# Patient Record
Sex: FEMALE | Race: ASIAN | NOT HISPANIC OR LATINO | Employment: UNEMPLOYED | URBAN - METROPOLITAN AREA
[De-identification: names, ages, dates, MRNs, and addresses within clinical notes are randomized per-mention and may not be internally consistent; named-entity substitution may affect disease eponyms.]

---

## 2020-11-17 ENCOUNTER — OFFICE VISIT - HEALTHEAST (OUTPATIENT)
Dept: FAMILY MEDICINE | Facility: CLINIC | Age: 40
End: 2020-11-17

## 2020-11-17 DIAGNOSIS — Z13.220 ENCOUNTER FOR SCREENING FOR LIPOID DISORDERS: ICD-10-CM

## 2020-11-17 DIAGNOSIS — Z23 NEED FOR VACCINATION: ICD-10-CM

## 2020-11-17 DIAGNOSIS — Z13.1 ENCOUNTER FOR SCREENING FOR DIABETES MELLITUS: ICD-10-CM

## 2020-11-17 DIAGNOSIS — F32.A DEPRESSION, UNSPECIFIED DEPRESSION TYPE: ICD-10-CM

## 2020-11-17 DIAGNOSIS — H61.23 BILATERAL IMPACTED CERUMEN: ICD-10-CM

## 2020-11-17 DIAGNOSIS — Z00.01 ENCOUNTER FOR ROUTINE ADULT HEALTH EXAMINATION WITH ABNORMAL FINDINGS: ICD-10-CM

## 2020-11-17 LAB — HIV 1+2 AB+HIV1 P24 AG SERPL QL IA: NEGATIVE

## 2020-11-17 RX ORDER — RISPERIDONE 1 MG/1
1 TABLET ORAL 2 TIMES DAILY
Status: SHIPPED | COMMUNITY
Start: 2020-11-10

## 2020-11-17 ASSESSMENT — ANXIETY QUESTIONNAIRES
1. FEELING NERVOUS, ANXIOUS, OR ON EDGE: NOT AT ALL
6. BECOMING EASILY ANNOYED OR IRRITABLE: SEVERAL DAYS
5. BEING SO RESTLESS THAT IT IS HARD TO SIT STILL: NOT AT ALL
2. NOT BEING ABLE TO STOP OR CONTROL WORRYING: NOT AT ALL
IF YOU CHECKED OFF ANY PROBLEMS ON THIS QUESTIONNAIRE, HOW DIFFICULT HAVE THESE PROBLEMS MADE IT FOR YOU TO DO YOUR WORK, TAKE CARE OF THINGS AT HOME, OR GET ALONG WITH OTHER PEOPLE: NOT DIFFICULT AT ALL
3. WORRYING TOO MUCH ABOUT DIFFERENT THINGS: NOT AT ALL
4. TROUBLE RELAXING: NOT AT ALL
7. FEELING AFRAID AS IF SOMETHING AWFUL MIGHT HAPPEN: NOT AT ALL
GAD7 TOTAL SCORE: 1

## 2020-11-17 ASSESSMENT — PATIENT HEALTH QUESTIONNAIRE - PHQ9: SUM OF ALL RESPONSES TO PHQ QUESTIONS 1-9: 4

## 2020-11-17 ASSESSMENT — MIFFLIN-ST. JEOR: SCORE: 1051.55

## 2020-11-18 LAB — HCV AB SERPL QL IA: NEGATIVE

## 2020-11-19 ENCOUNTER — COMMUNICATION - HEALTHEAST (OUTPATIENT)
Dept: INTERNAL MEDICINE | Facility: CLINIC | Age: 40
End: 2020-11-19

## 2021-05-27 ASSESSMENT — PATIENT HEALTH QUESTIONNAIRE - PHQ9: SUM OF ALL RESPONSES TO PHQ QUESTIONS 1-9: 4

## 2021-05-28 ASSESSMENT — ANXIETY QUESTIONNAIRES: GAD7 TOTAL SCORE: 1

## 2021-06-04 VITALS
OXYGEN SATURATION: 100 % | BODY MASS INDEX: 23.18 KG/M2 | HEART RATE: 64 BPM | WEIGHT: 110.4 LBS | DIASTOLIC BLOOD PRESSURE: 74 MMHG | HEIGHT: 58 IN | SYSTOLIC BLOOD PRESSURE: 118 MMHG | TEMPERATURE: 96.4 F | RESPIRATION RATE: 18 BRPM

## 2021-06-21 NOTE — LETTER
Letter by Lalita Kelly MD at      Author: Lalita Kelly MD Service: -- Author Type: --    Filed:  Encounter Date: 11/19/2020 Status: (Other)         Louise Bagley  [address] [place] [address]             November 19, 2020         Dear Ms. Bagley,    Below are the results from your recent visit:    Resulted Orders   HIV Antigen/Antibody Screening Cascade   Result Value Ref Range    HIV Antigen / Antibody Negative Negative    Narrative    Method is Abbott HIV Ag/Ab for the detection of HIV p24 antigen, HIV-1 antibodies and HIV-2 antibodies.   Hepatitis C Antibody (Anti-HCV) (pts born 9[birth year])   Result Value Ref Range    Hepatitis C Ab Negative Negative       Negative HIV/Hepatitis C test results (That's good).    Please call with questions or contact us using [employer]    Sincerely,        Electronically signed by Lalita Kelly MD

## 2021-06-30 NOTE — PROGRESS NOTES
Progress Notes by Lalita Kelly MD at 11/17/2020  2:00 PM     Author: Lalita Kelly MD Service: -- Author Type: Physician    Filed: 11/23/2020 12:40 PM Encounter Date: 11/17/2020 Status: Addendum    : Lalita Kelly MD (Physician)    Related Notes: Original Note by Lalita Kelly MD (Physician) filed at 11/17/2020  2:47 PM       FEMALE PREVENTATIVE EXAM    Assessment and Plan:     Patient has been advised of split billing requirements and indicates understanding: Yes    1. Encounter for routine adult health examination with abnormal findings  Discussed one-time HIV and hepatitis C screening.  Ordered today.  Patient declined breast and Pap smear today.  - HIV Antigen/Antibody Screening Cascade  - Hepatitis C Antibody (Anti-HCV) (pts born 7[birth year]2194)    2. Bilateral impacted cerumen  Discussed with patient findings, which is likely the cause of the symptoms she was experiencing.  Patient does note improvement after cerumen removal with irrigation.    Depression:  Followed by psychiatry, SEBASTI[place]N completed to get records from psychiatry.  They are managing her risperidone.    3. Need for vaccination  I have discussed the risks and benefits of all of the vaccine components with the patient.  All questions have been answered.  - Influenza, Seasonal Quad, PF =/> 6months  - Tdap vaccine,  6yo or older,  IM    4. Encounter for screening for diabetes mellitus  5. Encounter for screening for lipoid disorders  She is not fasting today return in the next 1 to 2 weeks for fasting labs.  - Glucose- FASTING FUTURE; Future    - Lipid Cascade- FASTING FUTURE; Future     Next follow up:  Return in about 1 year (around 11/17/2021) for Annual physical.    Immunization Review  Adult Imm Review: Due today, orders placed      I discussed the following with the patient:   Adult Healthy Living: Importance of regular exercise  Healthy nutrition    I have had an Advance Directives discussion with the patient.    Subjective:   Chief  "Complaint: Louise Bagley is an 40 y.o. female, new to [facility], here for a preventative health visit.  She is here today with brother, who is interpreting for her.     Patient has been advised of split billing requirements and indicates understanding: Yes     HPI:  Brother interpreting for patient:    Patient and brother states that over the last 1.5 months patient has had decreased hearing.  No ear pain, but feels like both ears are \"blocked\".   No fevers or rhinorrhea.  Haven't tried any OTC medications.  No sore throat or cough.      Patient sees Dr. Cabrales, psychiatrist, who manages patient's risperidone.  Doesn't recall clinic name or doctor's first name. Per patient's brother, patient has \"long history of depression.\" No active suicidal or homicidal ideation. Contracts for safety.    Healthy Habits  Are you taking a daily aspirin? No  Do you typically exercising at least 40 min, 3-4 times per week?  NO  Do you usually eat at least 4 servings of fruit and vegetables a day, include whole grains and fiber and avoid regularly eating high fat foods? Yes  Have you had an eye exam in the past two years? Yes  Do you see a dentist twice per year? NO  Do you have any concerns regarding sleep? No    Safety Screen  If you own firearms, are they secured in a locked gun cabinet or with trigger locks? Yes  Do you feel you are safe where you are living?: Yes (11/17/2020  1:53 PM)  Do you feel you are safe in your relationship(s)?: Yes (11/17/2020  1:53 PM)      Review of Systems:  Please see above.  The rest of the review of systems are negative for all systems.     Pap History:   unsure when patient last had pap smear.  Patient declines pap smear today  Cancer Screening       Status Date      PAP SMEAR Overdue 10/3/2000           Patient Care Team:  Provider, No Primary Care as PCP - General        History     Reviewed By Date/Time Sections Reviewed    Lalita Kelly MD 11/17/2020  2:08 PM Social Documentation    " "Lalita Kelly MD 11/17/2020  2:07 PM Medical, Surgical, Family            Objective:   Vital Signs:   Visit Vitals  /74 (Patient Site: Right Arm, Patient Position: Sitting, Cuff Size: Adult Regular)   Pulse 64   Temp (!) 96.4  F (35.8  C) (Tympanic)   Resp 18   Ht 4' 9.75\" (1.467 m)   Wt 110 lb 6.4 oz (50.1 kg)   SpO2 100%   BMI 23.27 kg/m           PHYSICAL EXAM  General Appearance: Alert, cooperative, no distress, appears stated age  Head: Normocephalic, without obvious abnormality, atraumatic  Eyes: PERRL, conjunctiva/corneas clear, EOM's intact  Ears: Normal TM's and external ear canals, both ears AFTER CA removed significant amount of cerumen in both ears bilaterally with ear lavage.  Nose: Nares normal, septum midline,mucosa normal, no drainage  Throat: Lips, mucosa, and tongue normal; teeth and gums normal  Neck: Supple, symmetrical, trachea midline, no adenopathy;  thyroid: not enlarged, symmetric, no tenderness/mass/nodules;   Back: Symmetric, no curvature, ROM normal, no CVA tenderness  Lungs: Clear to auscultation bilaterally, respirations unlabored  Breasts: declined, per patient request  Heart: Regular rate and rhythm, no murmur.   Abdomen: Soft, non-tender, bowel sounds active all four quadrants,  no masses, no organomegaly  Pelvic:declined, per patient request  Extremities: Extremities normal, atraumatic, no cyanosis or edema  Skin: Skin color, texture, turgor normal, no rashes or lesions  Lymph nodes: Cervical, supraclavicular, and axillary nodes normal  Neurologic: Alert.   Psych: Normal affect.  Does not appear anxious or depressed.        The ASCVD Risk score (Royer DC Jr., et al., 2013) failed to calculate for the following reasons:    Cannot find a previous HDL lab    Cannot find a previous total cholesterol lab         Medication List          Accurate as of November 17, 2020  2:45 PM. If you have any questions, ask your nurse or doctor.            CONTINUE taking these medications  "   risperiDONE 1 MG tablet  Also known as: RisperDAL  INSTRUCTIONS: Take 1 mg by mouth 2 (two) times a day. Takes 2 in the evening, 1 in the morning               Additional Screenings Completed Today:

## 2022-02-01 ENCOUNTER — TELEPHONE (OUTPATIENT)
Dept: INTERNAL MEDICINE | Facility: CLINIC | Age: 42
End: 2022-02-01
Payer: COMMERCIAL

## 2022-02-01 NOTE — LETTER
Louise Bagley  [address] [place] [address]          We have been unable to reach you by phone and have left messages.  You have been incorrectly scheduled with Dr. Méndez at the [facility].  Dr. Méndez is not taking patients into his practice at this time.     Please call [phone] and we can assist you with scheduling with another provider.  The current providers at [facility] taking new patients are Dr. Clemens, Dr. Munoz, and Dr. Xavier                        [address] [address] [phone]

## 2022-02-01 NOTE — TELEPHONE ENCOUNTER
Used  line to inform of Dr. Méndez's message and assist with rescheduling appointment.    Left message for pt to call back if pt calls back please inform of Dr. Méndez's message and assist with scheduling pt with a provider taking new pts.  At [facility] that is Dr. Munoz, Dr. Clemens, and Dr. Xavier.

## 2022-02-01 NOTE — TELEPHONE ENCOUNTER
Please call patient -    ______________________________________________________________________     Home phone:  [phone] (home)     Cell phone:   Telephone Information:   Mobile [phone]       Other contacts:  Name Home Phone Work Phone Mobile Phone Relationship Lgl Lonnie GAN,JASEN   [phone] Other      ______________________________________________________________________     Need  for call.    She is scheduled for a physical but she was incorrectly scheduled.    She should be scheduled with a provider taking new patients.    Aj Méndez MD  [facility]  2/1/2022, 2:40 PM

## 2022-02-04 NOTE — TELEPHONE ENCOUNTER
Called PT, left a Voicemail to call and get scheduled with a provider that is taking new patients.

## 2022-06-10 NOTE — PROGRESS NOTES
SUBJECTIVE:   CC: Louise Bagley is an 41 year old woman who presents for preventive health visit.     Patient has been advised of split billing requirements and indicates understanding: Yes  Healthy Habits:     Getting at least 3 servings of Calcium per day:  NO    Bi-annual eye exam:  NO    Dental care twice a year:  NO    Sleep apnea or symptoms of sleep apnea:  None    Diet:  Regular (no restrictions)    Frequency of exercise:  None    Taking medications regularly:  Yes    Medication side effects:  None    PHQ-2 Total Score: 2    Additional concerns today:  No      Today's PHQ-2 Score:   PHQ-2 ( 1999 Pfizer) 6/13/2022   Q1: Little interest or pleasure in doing things 0   Q2: Feeling down, depressed or hopeless 2   PHQ-2 Score 2   Q1: Little interest or pleasure in doing things Not at all   Q2: Feeling down, depressed or hopeless More than half the days   PHQ-2 Score 2       Abuse: Current or Past (Physical, Sexual or Emotional) - No  Do you feel safe in your environment? Yes        Social History     Tobacco Use     Smoking status: Never Smoker     Smokeless tobacco: Never Used   Substance Use Topics     Alcohol use: Never     If you drink alcohol do you typically have >3 drinks per day or >7 drinks per week? No    Alcohol Use 6/13/2022   Prescreen: >3 drinks/day or >7 drinks/week? No   No flowsheet data found.    Reviewed orders with patient.  Reviewed health maintenance and updated orders accordingly - Yes  Lab work is in process  Labs reviewed in EPIC  BP Readings from Last 3 Encounters:   06/13/22 118/80   11/17/20 118/74    Wt Readings from Last 3 Encounters:   06/13/22 42.9 kg (94 lb 9.6 oz)   11/17/20 50.1 kg (110 lb 6.4 oz)                  There is no problem list on file for this patient.    Past Surgical History:   Procedure Laterality Date     NO PAST SURGERIES         Social History     Tobacco Use     Smoking status: Never Smoker     Smokeless tobacco: Never Used   Substance Use Topics      Alcohol use: Never     Family History   Problem Relation Age of Onset     Gallbladder Disease Father          of gallbladder cancer     Autism Spectrum Disorder Nephew      Diabetes Type 1 Nephew          Current Outpatient Medications   Medication Sig Dispense Refill     risperiDONE (RISPERDAL) 1 MG tablet [RISPERIDONE (RISPERDAL) 1 MG TABLET] Take 1 mg by mouth 2 (two) times a day. Takes 2 in the evening, 1 in the morning       No Known Allergies    Breast Cancer Screening:  Any new diagnosis of family breast, ovarian, or bowel cancer? No    FHS-7: No flowsheet data found.      Pertinent mammograms are reviewed under the imaging tab.    History of abnormal Pap smear: NO - age 30-65 PAP every 5 years with negative HPV co-testing recommended     Reviewed and updated as needed this visit by clinical staff   Tobacco  Allergies    Med Hx  Surg Hx  Fam Hx  Soc Hx          Reviewed and updated as needed this visit by Provider                   History reviewed. No pertinent past medical history.   Past Surgical History:   Procedure Laterality Date     NO PAST SURGERIES         Review of Systems   Respiratory: Positive for cough.    Neurological: Positive for weakness.     CONSTITUTIONAL: NEGATIVE for fever, chills, change in weight  INTEGUMENTARU/SKIN: NEGATIVE for worrisome rashes, moles or lesions  EYES: NEGATIVE for vision changes or irritation  ENT: NEGATIVE for ear, mouth and throat problems  RESP: NEGATIVE for significant cough or SOB  BREAST: NEGATIVE for masses, tenderness or discharge  CV: NEGATIVE for chest pain, palpitations or peripheral edema  GI: NEGATIVE for nausea, abdominal pain, heartburn, or change in bowel habits  : NEGATIVE for unusual urinary or vaginal symptoms. Periods are regular.  MUSCULOSKELETAL: NEGATIVE for significant arthralgias or myalgia  NEURO: NEGATIVE for weakness, dizziness or paresthesias  PSYCHIATRIC: NEGATIVE for changes in mood or affect     OBJECTIVE:   /80 (BP  "Location: Right arm, Patient Position: Sitting, Cuff Size: Adult Small)   Pulse 74   Temp 98.1  F (36.7  C) (Oral)   Resp 16   Ht 1.454 m (4' 9.25\")   Wt 42.9 kg (94 lb 9.6 oz)   LMP 05/27/2022 (Approximate)   SpO2 98%   BMI 20.29 kg/m    Physical Exam  GENERAL: healthy, alert and no distress  RESP: lungs clear to auscultation - no rales, rhonchi or wheezes  CV: regular rate and rhythm, normal S1 S2, no S3 or S4, no murmur, click or rub, no peripheral edema and peripheral pulses strong  PSYCH: mentation appears normal, affect anxious    ASSESSMENT/PLAN:       ICD-10-CM    1. Routine general medical examination at a health care facility  Z00.00 TSH with free T4 reflex     Vitamin D Deficiency     CBC with platelets     Lipid Profile (Chol, Trig, HDL, LDL calc)     Hemoglobin A1c     Comprehensive metabolic panel (BMP + Alb, Alk Phos, ALT, AST, Total. Bili, TP)   2. Cervical cancer screening  Z12.4 Pap Screen with HPV - recommended age 30 - 65 years   3. Chronic midline low back pain without sciatica  M54.50 XR Lumbar Spine 2/3 Views    G89.29    4. Weight loss  R63.4 Comprehensive metabolic panel (BMP + Alb, Alk Phos, ALT, AST, Total. Bili, TP)   5. Schizophrenia, unspecified type (H)  F20.9    6. Intellectual delay  F81.9        Patient is a 41-year-old female with PMH of schizophrenia, intellectual delay and tardive dyskinesia who presents with her brother for annual physical.     is on the line for this visit    They moved from [place] a few years ago.  Does not see a doctor often.  Today, patient is quite fearful of a physical exam  Has some level of intellectual delay and does not seem completely understanding of the visit today  Does not answer questions fully  Only did heart/lung exam.  Pap smear, breast exam deferred  No known family history of cancers  No high risk behaviors identified or substance abuse identified   Menses-neither the brother or the patient was able to provide me with " "this information.  Unclear if it is regular or heavy  Needs updated eye and dental exam   Main concern for the brother today is that she has been losing weight over the last few months.  Appetite is decreased.  She appears to be weak and cannot move around a lot.  No changes in her medications.  No new stressors.  We will get some baseline blood work to start with her fatigue and weight loss.  We will see her back closely in 6 weeks for ongoing work-up.    Schizophrenia  Does have a psychiatrist that she follows with.  Is currently on risperidone.  Stable per brother.    Low back pain:  Patient repeatedly pointed to her low back.  Said it hurt but was not able to let me do an exam on her.  We will start with an x-ray of the lumbar spine and go from there.    Patient has been advised of split billing requirements and indicates understanding: Yes    COUNSELING:  Reviewed preventive health counseling, as reflected in patient instructions    Estimated body mass index is 20.29 kg/m  as calculated from the following:    Height as of this encounter: 1.454 m (4' 9.25\").    Weight as of this encounter: 42.9 kg (94 lb 9.6 oz).    She reports that she has never smoked. She has never used smokeless tobacco.    Bettye Munoz DO  [facility]  "

## 2022-06-13 ENCOUNTER — OFFICE VISIT (OUTPATIENT)
Dept: FAMILY MEDICINE | Facility: CLINIC | Age: 42
End: 2022-06-13
Payer: COMMERCIAL

## 2022-06-13 ENCOUNTER — HOSPITAL ENCOUNTER (OUTPATIENT)
Dept: GENERAL RADIOLOGY | Facility: HOSPITAL | Age: 42
Discharge: HOME | End: 2022-06-13
Attending: STUDENT IN AN ORGANIZED HEALTH CARE EDUCATION/TRAINING PROGRAM | Admitting: STUDENT IN AN ORGANIZED HEALTH CARE EDUCATION/TRAINING PROGRAM
Payer: COMMERCIAL

## 2022-06-13 VITALS
SYSTOLIC BLOOD PRESSURE: 118 MMHG | OXYGEN SATURATION: 98 % | DIASTOLIC BLOOD PRESSURE: 80 MMHG | BODY MASS INDEX: 20.41 KG/M2 | HEART RATE: 74 BPM | WEIGHT: 94.6 LBS | RESPIRATION RATE: 16 BRPM | TEMPERATURE: 98.1 F | HEIGHT: 57 IN

## 2022-06-13 DIAGNOSIS — Z00.00 ROUTINE GENERAL MEDICAL EXAMINATION AT A HEALTH CARE FACILITY: Primary | ICD-10-CM

## 2022-06-13 DIAGNOSIS — F20.9 SCHIZOPHRENIA, UNSPECIFIED TYPE (H): ICD-10-CM

## 2022-06-13 DIAGNOSIS — Z12.4 CERVICAL CANCER SCREENING: ICD-10-CM

## 2022-06-13 DIAGNOSIS — G89.29 CHRONIC MIDLINE LOW BACK PAIN WITHOUT SCIATICA: ICD-10-CM

## 2022-06-13 DIAGNOSIS — F81.9 INTELLECTUAL DELAY: ICD-10-CM

## 2022-06-13 DIAGNOSIS — M54.50 CHRONIC MIDLINE LOW BACK PAIN WITHOUT SCIATICA: ICD-10-CM

## 2022-06-13 DIAGNOSIS — R63.4 WEIGHT LOSS: ICD-10-CM

## 2022-06-13 LAB
ALBUMIN SERPL-MCNC: 3.8 G/DL (ref 3.5–5)
ALP SERPL-CCNC: 90 U/L (ref 45–120)
ALT SERPL W P-5'-P-CCNC: 24 U/L (ref 0–45)
ANION GAP SERPL CALCULATED.3IONS-SCNC: 11 MMOL/L (ref 5–18)
AST SERPL W P-5'-P-CCNC: 13 U/L (ref 0–40)
BILIRUB SERPL-MCNC: 1.3 MG/DL (ref 0–1)
BUN SERPL-MCNC: 8 MG/DL (ref 8–22)
CALCIUM SERPL-MCNC: 9.1 MG/DL (ref 8.5–10.5)
CHLORIDE BLD-SCNC: 104 MMOL/L (ref 98–107)
CHOLEST SERPL-MCNC: 102 MG/DL
CO2 SERPL-SCNC: 27 MMOL/L (ref 22–31)
CREAT SERPL-MCNC: 0.7 MG/DL (ref 0.6–1.1)
DEPRECATED CALCIDIOL+CALCIFEROL SERPL-MC: 6 UG/L (ref 20–75)
ERYTHROCYTE [DISTWIDTH] IN BLOOD BY AUTOMATED COUNT: 12.1 % (ref 10–15)
FASTING STATUS PATIENT QL REPORTED: NO
GFR SERPL CREATININE-BSD FRML MDRD: >90 ML/MIN/1.73M2
GLUCOSE BLD-MCNC: 98 MG/DL (ref 70–125)
HBA1C MFR BLD: 5.5 % (ref 0–5.6)
HCT VFR BLD AUTO: 38.2 % (ref 35–47)
HDLC SERPL-MCNC: 37 MG/DL
HGB BLD-MCNC: 13 G/DL (ref 11.7–15.7)
LDLC SERPL CALC-MCNC: 45 MG/DL
MCH RBC QN AUTO: 29.8 PG (ref 26.5–33)
MCHC RBC AUTO-ENTMCNC: 34 G/DL (ref 31.5–36.5)
MCV RBC AUTO: 88 FL (ref 78–100)
PLATELET # BLD AUTO: 241 10E3/UL (ref 150–450)
POTASSIUM BLD-SCNC: 4 MMOL/L (ref 3.5–5)
PROT SERPL-MCNC: 7 G/DL (ref 6–8)
RBC # BLD AUTO: 4.36 10E6/UL (ref 3.8–5.2)
SODIUM SERPL-SCNC: 142 MMOL/L (ref 136–145)
TRIGL SERPL-MCNC: 100 MG/DL
TSH SERPL DL<=0.005 MIU/L-ACNC: 3.31 UIU/ML (ref 0.3–5)
WBC # BLD AUTO: 6.4 10E3/UL (ref 4–11)

## 2022-06-13 PROCEDURE — 84443 ASSAY THYROID STIM HORMONE: CPT | Performed by: STUDENT IN AN ORGANIZED HEALTH CARE EDUCATION/TRAINING PROGRAM

## 2022-06-13 PROCEDURE — 80053 COMPREHEN METABOLIC PANEL: CPT | Performed by: STUDENT IN AN ORGANIZED HEALTH CARE EDUCATION/TRAINING PROGRAM

## 2022-06-13 PROCEDURE — 85027 COMPLETE CBC AUTOMATED: CPT | Performed by: STUDENT IN AN ORGANIZED HEALTH CARE EDUCATION/TRAINING PROGRAM

## 2022-06-13 PROCEDURE — 99213 OFFICE O/P EST LOW 20 MIN: CPT | Mod: 25 | Performed by: STUDENT IN AN ORGANIZED HEALTH CARE EDUCATION/TRAINING PROGRAM

## 2022-06-13 PROCEDURE — 72100 X-RAY EXAM L-S SPINE 2/3 VWS: CPT | Mod: FY

## 2022-06-13 PROCEDURE — 82306 VITAMIN D 25 HYDROXY: CPT | Performed by: STUDENT IN AN ORGANIZED HEALTH CARE EDUCATION/TRAINING PROGRAM

## 2022-06-13 PROCEDURE — 99396 PREV VISIT EST AGE 40-64: CPT | Performed by: STUDENT IN AN ORGANIZED HEALTH CARE EDUCATION/TRAINING PROGRAM

## 2022-06-13 PROCEDURE — 80061 LIPID PANEL: CPT | Performed by: STUDENT IN AN ORGANIZED HEALTH CARE EDUCATION/TRAINING PROGRAM

## 2022-06-13 PROCEDURE — 36415 COLL VENOUS BLD VENIPUNCTURE: CPT | Performed by: STUDENT IN AN ORGANIZED HEALTH CARE EDUCATION/TRAINING PROGRAM

## 2022-06-13 PROCEDURE — 83036 HEMOGLOBIN GLYCOSYLATED A1C: CPT | Performed by: STUDENT IN AN ORGANIZED HEALTH CARE EDUCATION/TRAINING PROGRAM

## 2022-06-13 ASSESSMENT — ENCOUNTER SYMPTOMS
COUGH: 1
WEAKNESS: 1

## 2022-06-13 ASSESSMENT — PAIN SCALES - GENERAL: PAINLEVEL: NO PAIN (0)

## 2022-06-15 ENCOUNTER — TELEPHONE (OUTPATIENT)
Dept: FAMILY MEDICINE | Facility: CLINIC | Age: 42
End: 2022-06-15
Payer: COMMERCIAL

## 2022-06-15 DIAGNOSIS — G89.29 CHRONIC MIDLINE LOW BACK PAIN WITHOUT SCIATICA: Primary | ICD-10-CM

## 2022-06-15 DIAGNOSIS — E55.9 VITAMIN D DEFICIENCY: Primary | ICD-10-CM

## 2022-06-15 DIAGNOSIS — M54.50 CHRONIC MIDLINE LOW BACK PAIN WITHOUT SCIATICA: Primary | ICD-10-CM

## 2022-06-15 NOTE — TELEPHONE ENCOUNTER
"Called and spoke with \"Fanny.\"  Message below relayed to him.  Patient had x-ray completed on 6/13.  Patient asked if the Vitamin D supplements can be prescribed as an Rx.  Writer informed this would be sent to Dr. Munoz to review.   "

## 2022-06-15 NOTE — TELEPHONE ENCOUNTER
Detailed message left on voicemail with Dr. Munoz's message and recommendations below.  PT number also provided in voicemail message and to call clinic back if they have any other questions.

## 2022-06-15 NOTE — TELEPHONE ENCOUNTER
----- Message from Bettye Munoz DO sent at 6/15/2022  7:21 AM CDT -----  Call the brother to relay message:     Lumbar Xray doesn't show any arthritis or disc abnormalities. Her pain in the low back is most likely due to muscular pain.  I think she would benefit from dedication physical therapy. Referral has been placed.

## 2022-06-16 RX ORDER — ERGOCALCIFEROL 1.25 MG/1
50000 CAPSULE, LIQUID FILLED ORAL WEEKLY
Qty: 8 CAPSULE | Refills: 0 | Status: SHIPPED | OUTPATIENT
Start: 2022-06-16 | End: 2022-08-05

## 2022-07-18 NOTE — PROGRESS NOTES
FOLLOW UP WEIGHT LOSS***41/F  SR 6/13    PHYSICAL DUE    PAP DUE- NA  MAMMOGRAM- NA       SUBJECTIVE:   CC: Louise Bagley is an 41 year old woman who presents for preventive health visit.     {Split Bill scripting  The purpose of this visit is to discuss your medical history and prevent health problems before you are sick. You may be responsible for a co-pay, coinsurance, or deductible if your visit today includes services such as checking on a sore throat, having an x-ray or lab test, or treating and evaluating a new or existing condition :203462}  {Patient advised of split billing (Optional):614862}  HPI  {Add if <65 person on Medicare  - Required Questions (Optional):932687}  {Outside tests to abstract? :029574}    {additional problems to add (Optional):404035}    Today's PHQ-2 Score:   PHQ-2 ( 1999 Pfizer) 6/13/2022   Q1: Little interest or pleasure in doing things 0   Q2: Feeling down, depressed or hopeless 2   PHQ-2 Score 2   Q1: Little interest or pleasure in doing things Not at all   Q2: Feeling down, depressed or hopeless More than half the days   PHQ-2 Score 2       Abuse: Current or Past (Physical, Sexual or Emotional) - { :540944}  Do you feel safe in your environment? { :231135}        Social History     Tobacco Use     Smoking status: Never Smoker     Smokeless tobacco: Never Used   Substance Use Topics     Alcohol use: Never     {Rooming Staff- Complete this question if Prescreen response is not shown below for today's visit. If you drink alcohol do you typically have >3 drinks per day or >7 drinks per week? (Optional):933414}    Alcohol Use 6/13/2022   Prescreen: >3 drinks/day or >7 drinks/week? No   Prescreen: >3 drinks/day or >7 drinks/week? -   {add AUDIT responses (Optional) (A score of 7 for adult men is an indication of hazardous drinking; a score of 8 or more is an indication of an alcohol use disorder.  A score of 7 or more for adult women is an indication of hazardous drinking or an  "alchohol use disorder):706115}    Reviewed orders with patient.  Reviewed health maintenance and updated orders accordingly - { :049169::\"Yes\"}  {Chronicprobdata (optional):897450}    Breast Cancer Screening:    Breast CA Risk Assessment (FHS-7) 6/13/2022   Do you have a family history of breast, colon, or ovarian cancer? No / Unknown       {If any of the questions to the BCRA (FHS-7) are answered yes, consider ordering referral for genetic counseling (Optional) :614619::\"click delete button to remove this line now\"}  {AMB Mammogram Decision Support (Optional) :759699}  Pertinent mammograms are reviewed under the imaging tab.    History of abnormal Pap smear: { :716103}     Reviewed and updated as needed this visit by clinical staff                    Reviewed and updated as needed this visit by Provider                   {HISTORY OPTIONS (Optional):093566}    Review of Systems  {FEMALE ROS (Optional):286599}     OBJECTIVE:   LMP 05/27/2022 (Approximate)   Physical Exam  {Exam Choices (Optional):041032}    {Diagnostic Test Results (Optional):601567::\"Diagnostic Test Results:\",\"Labs reviewed in Epic\"}    ASSESSMENT/PLAN:   {Diag Picklist:812449}    {Patient advised of split billing (Optional):777979}    COUNSELING:  {FEMALE COUNSELING MESSAGES:688312::\"Reviewed preventive health counseling, as reflected in patient instructions\"}    Estimated body mass index is 20.29 kg/m  as calculated from the following:    Height as of 6/13/22: 1.454 m (4' 9.25\").    Weight as of 6/13/22: 42.9 kg (94 lb 9.6 oz).    {Weight Management Plan (ACO) Complete if BMI is abnormal-  Ages 18-64  BMI >24.9.  Age 65+ with BMI <23 or >30 (Optional):757707}    She reports that she has never smoked. She has never used smokeless tobacco.      Counseling Resources:  ATP IV Guidelines  Pooled Cohorts Equation Calculator  Breast Cancer Risk Calculator  BRCA-Related Cancer Risk Assessment: FHS-7 Tool  FRAX Risk Assessment  ICSI Preventive " Guidelines  Dietary Guidelines for Americans, 2010  USDA's MyPlate  ASA Prophylaxis  Lung CA Screening    Bettye Munoz DO  [facility]

## 2022-07-19 ENCOUNTER — OFFICE VISIT (OUTPATIENT)
Dept: FAMILY MEDICINE | Facility: CLINIC | Age: 42
End: 2022-07-19
Payer: COMMERCIAL

## 2022-07-19 VITALS
DIASTOLIC BLOOD PRESSURE: 76 MMHG | TEMPERATURE: 98.6 F | OXYGEN SATURATION: 98 % | BODY MASS INDEX: 20.4 KG/M2 | SYSTOLIC BLOOD PRESSURE: 110 MMHG | HEART RATE: 75 BPM | RESPIRATION RATE: 16 BRPM | WEIGHT: 95.1 LBS

## 2022-07-19 DIAGNOSIS — H91.93 DECREASED HEARING, BILATERAL: ICD-10-CM

## 2022-07-19 DIAGNOSIS — R63.4 WEIGHT LOSS: Primary | ICD-10-CM

## 2022-07-19 DIAGNOSIS — E55.9 VITAMIN D DEFICIENCY: ICD-10-CM

## 2022-07-19 PROCEDURE — 99214 OFFICE O/P EST MOD 30 MIN: CPT | Performed by: STUDENT IN AN ORGANIZED HEALTH CARE EDUCATION/TRAINING PROGRAM

## 2022-07-19 RX ORDER — ERGOCALCIFEROL 1.25 MG/1
50000 CAPSULE, LIQUID FILLED ORAL WEEKLY
Qty: 8 CAPSULE | Refills: 0 | Status: CANCELLED | OUTPATIENT
Start: 2022-07-19

## 2022-07-19 RX ORDER — CHOLECALCIFEROL (VITAMIN D3) 50 MCG
1 TABLET ORAL DAILY
Qty: 90 TABLET | Refills: 3 | Status: SHIPPED | OUTPATIENT
Start: 2022-08-12

## 2022-07-19 ASSESSMENT — PAIN SCALES - GENERAL: PAINLEVEL: NO PAIN (0)

## 2022-07-19 NOTE — PROGRESS NOTES
Assessment & Plan   Problem List Items Addressed This Visit        Other    Weight loss - Primary      Other Visit Diagnoses     Vitamin D deficiency        Relevant Medications    vitamin D3 (CHOLECALCIFEROL) 50 mcg (2000 units) tablet (Start on 8/12/2022)    Decreased hearing, bilateral        Relevant Medications    vitamin D3 (CHOLECALCIFEROL) 50 mcg (2000 units) tablet (Start on 8/12/2022)    Other Relevant Orders    Adult Audiology  Referral           Weight loss:  Patient has gained a pound since I last saw her  Does seem more energetic and interactive  Is tolerating the vitamin D well  Given stable weight and improved energy on the vitamin D, we will not do any further work-up  Plan:  Finish up weekly vitamin D 50,000 IU.  Once done with the weekly dosing, should switch to vitamin D 2000 IU daily.  This is available OTC.  Brother verbalized understanding.    Low back pain:  Has been a chronic ongoing issue  Lumbar x-ray was unremarkable and patient was sent to physical therapy for low back pain  The back pain has not progressed and she is not reporting any fecal/urinary incontinence or saddle anesthesias  They have an upcoming appointment with physical therapy    Decreased hearing:  Patient is reporting that she has decreased hearing bilaterally  Physical exam is negative for any evidence of infection, effusion, cerumen impaction or irritation.  Unclear why patient is having subjective bilateral decreased hearing acuity.  Will send to audiology for official hearing test as language barrier and intellectual delay make it hard to discern her symptoms.      35 minutes spent on the date of the encounter doing chart review, history and exam, documentation and further activities per the note    Return in about 3 months (around 10/19/2022) for weight .    Bettye Munoz DO  [facility]    John Gil is a 41 year old accompanied by her brother, presenting for the following  health issues:  Follow Up (On weight loss ) and Ear Fullness (She cant hear out of her right ear. And also the left ear x1 week, no pain )    Patient is a 41-year-old female with PMH of schizophrenia, intellectual delay and tardive dyskinesia who presents today for follow-up on weight loss    Was recently seen for an annual physical at which time mother was very concerned about fatigue and weight loss.  Blood work was significant for vitamin D deficiency.    Today, brother is translating in the room.  They declined an     Brother notes that she is more energetic ever since she started taking vitamin D consistently.  Has been eating a little bit more.  Has not necessarily gained a lot of weight but is not losing weight either.  They are trying to feed her more consistently.    Review of Systems   As per HPI       Objective    /76 (BP Location: Right arm, Patient Position: Sitting, Cuff Size: Adult Regular)   Pulse 75   Temp 98.6  F (37  C) (Oral)   Resp 16   Wt 43.1 kg (95 lb 1.6 oz)   LMP 06/26/2022   SpO2 98%   BMI 20.40 kg/m    Body mass index is 20.4 kg/m .  Physical Exam   GENERAL: thin, NAD   HENT: ear canals normal bilaterally without cerumen impaction, no evidence of AOM bilaterally, no serious effusion behind TMs.   NECK: no adenopathy, no asymmetry, masses, or scars and thyroid normal to palpation  RESP: lungs clear to auscultation - no rales, rhonchi or wheezes  CV: regular rate and rhythm, normal S1 S2, no S3 or S4, no murmur, click or rub, no peripheral edema and peripheral pulses strong  PSYCH: affect flat, anxious, delayed answering of questions.           .  ..

## 2022-08-19 ENCOUNTER — HOSPITAL ENCOUNTER (OUTPATIENT)
Dept: PHYSICAL THERAPY | Facility: REHABILITATION | Age: 42
Discharge: HOME | End: 2022-08-19
Attending: STUDENT IN AN ORGANIZED HEALTH CARE EDUCATION/TRAINING PROGRAM
Payer: COMMERCIAL

## 2022-08-19 DIAGNOSIS — M54.50 CHRONIC MIDLINE LOW BACK PAIN WITHOUT SCIATICA: ICD-10-CM

## 2022-08-19 DIAGNOSIS — M54.50 ACUTE BILATERAL LOW BACK PAIN WITHOUT SCIATICA: Primary | ICD-10-CM

## 2022-08-19 DIAGNOSIS — M62.81 MUSCLE WEAKNESS (GENERALIZED): ICD-10-CM

## 2022-08-19 DIAGNOSIS — G89.29 CHRONIC MIDLINE LOW BACK PAIN WITHOUT SCIATICA: ICD-10-CM

## 2022-08-19 PROCEDURE — 97110 THERAPEUTIC EXERCISES: CPT | Mod: GP | Performed by: PHYSICAL THERAPIST

## 2022-08-19 PROCEDURE — 97161 PT EVAL LOW COMPLEX 20 MIN: CPT | Mod: GP | Performed by: PHYSICAL THERAPIST

## 2022-08-19 NOTE — PROGRESS NOTES
[facility]    OUTPATIENT PHYSICAL THERAPY ORTHOPEDIC EVALUATION  PLAN OF TREATMENT FOR OUTPATIENT REHABILITATION  (COMPLETE FOR INITIAL CLAIMS ONLY)  Patient's Last Name, First Name, M.I.  YOB: 1980  Louise Bagley    Provider s Name:  [facility]   Medical Record No.  [id number]   Start of Care Date:  08/19/22   Onset Date:  05/19/22   Type:     _X__PT   ___OT   ___SLP Medical Diagnosis:  Chronic midline low back pain without sciatica     PT Diagnosis:  acute skyla low back pain without sciatica, muscle weakness   Visits from SOC:  1      _________________________________________________________________________________  Plan of Treatment/Functional Goals:  gait training, balance training, bed mobility training, joint mobilization, manual therapy, neuromuscular re-education, ROM, strengthening, stretching, transfer training     Cryotherapy, Electrical stimulation, Hot packs, TENS     Goals  Goal Identifier: (P) HEP  Goal Description: (P) Pt will be independent in HEP to address impairments and improve pain/function  Target Date: (P) 10/18/22    Goal Identifier: (P) Bending/lifting  Goal Description: (P) Pt will be able to bend and lift 10# with <4/10 pain and correct body mechanics for household chores  Target Date: (P) 11/16/22    Goal Identifier: (P) Walking  Goal Description: (P) Pt will be able to walk for 15-30 min with <3/10 pain for community mobility and exercise  Target Date: (P) 11/16/22                                                           Therapy Frequency:  (P) other (see comments) (1x to every few weeks due to clinic availability)  Predicted Duration of Therapy Intervention:  (P) 12 weeks for up to 8 sessions    Barb Cantrell, PT                 I CERTIFY THE NEED FOR THESE SERVICES FURNISHED UNDER        THIS PLAN OF TREATMENT AND WHILE UNDER MY  "CARE     (Physician co-signature of this document indicates review and certification of the therapy plan).                     Certification Date From:  08/19/22   Certification Date To:  11/16/22    Referring Provider:  Bettye Munoz DO    Initial Assessment        See Epic Evaluation Start of Care Date: 08/19/22 08/19/22 0900   General Information   Type of Visit Initial OP Ortho PT Evaluation   Start of Care Date 08/19/22   Referring Physician Bettye Munoz, DO   Patient/Family Goals Statement \"to safe from back pain\"   Certification Required? Yes   Medical Diagnosis Chronic midline low back pain without sciatica       Present Yes   Language Other  (French in person)   Body Part(s)   Body Part(s) Lumbar Spine/SI   Presentation and Etiology   Pertinent history of current problem (include personal factors and/or comorbidities that impact the POC) Pt reports skyla lumbar spine pain that will radiate into skyla buttocks. Her pain started about 3 months ago with insidious onset. Pt denies any numbness but intermittent tingling in both feet just in her toes. Pt reports difficulty with lifting, bending, transitions, and bed mobility. She walks on her treadmill for 15-30 min daily- does increase low back pain.   Impairments A. Pain;L. Tingling;E. Decreased flexibility;F. Decreased strength and endurance;D. Decreased ROM   Symptom Location skyla lumbar spine that can radiate to skyla buttocks   How/Where did it occur From insidious onset   Onset date of current episode/exacerbation 05/19/22   Chronicity New   Pain rating (0-10 point scale) Best (/10);Worst (/10)   Best (/10) 1   Worst (/10) 9   Pain quality C. Aching;B. Dull   Frequency of pain/symptoms A. Constant   Pain/symptoms are:   (worse by the end of her day)   Pain/symptoms exacerbated by B. Walking;I. Bending   Pain/symptoms eased by   (medications)   Progression of symptoms since onset: Worsened   Current / Previous " Interventions   Diagnostic Tests: X-ray   X-ray Results Results   X-ray results 5 lumbar vertebrae. Normal alignment. No displaced fracture. Vertebral body heights are maintained. No aggressive sclerotic or lytic osseous lesion. Mild degenerative disc disease of lumbar spine. No significant facet arthropathy. No acute   extraspinal abnormality.   Fall Risk Screen   Fall screen completed by PT   Have you fallen 2 or more times in the past year? No   Have you fallen and had an injury in the past year? No   Is patient a fall risk? No   Abuse Screen (yes response referral indicated)   Feels Unsafe at Home or Work/School no   Feels Threatened by Someone no   Does Anyone Try to Keep You From Having Contact with Others or Doing Things Outside Your Home? no   Physical Signs of Abuse Present no   System Outcome Measures   Outcome Measures   (Modified KIRSTIN: 40%)   Lumbar Spine/SI Objective Findings   Gait/Locomotion APTA 30 sec STS: 8.5 reps from standard chair with arms across chest   Flexion ROM to ankles with mild pain   Extension ROM moderate with pain   Right Side Bending ROM mild   Left Side Bending ROM mild   Lumbar ROM Comment Rotation: R mild, L mild   Hip Screen Hip PROM WNL skyla, negative Scour and FADIR skyla   Hip Flexion (L2) Strength 4+/5 on R, 4-/5 on L   Knee Extension (L3) Strength 5/5 on R, 3-/5 on L   Ankle Dorsiflexion (L4) Strength 5/5 on R, 4/5 on L   Palpation not tender along skyla lumbar paraspinals or buttocks   Slump Test R: negative, L negative but some confusion due to language barrier   Posture seated: forward shoulders, increased thoracic kyphosis   Planned Therapy Interventions   Planned Therapy Interventions gait training;balance training;bed mobility training;joint mobilization;manual therapy;neuromuscular re-education;ROM;strengthening;stretching;transfer training   Planned Modality Interventions   Planned Modality Interventions Cryotherapy;Electrical stimulation;Hot packs;TENS   Clinical  Impression   Criteria for Skilled Therapeutic Interventions Met yes, treatment indicated   PT Diagnosis acute skyla low back pain without sciatica, muscle weakness   Influenced by the following impairments decreased lumbar AROM with pain, LE/abdominal muscle weakness   Functional limitations due to impairments standing, walking, bending, lifting, bed mobility   Clinical Presentation Stable/Uncomplicated   Clinical Decision Making (Complexity) Low complexity   Therapy Frequency other (see comments)  (1x to every few weeks due to clinic availability)   Predicted Duration of Therapy Intervention (days/wks) 12 weeks for up to 8 sessions   Risk & Benefits of therapy have been explained Yes   Patient, Family & other staff in agreement with plan of care Yes   Clinical Impression Comments Pt presents with 3 month history of skyla lumbar spine pain with intermittent radiation of symptoms into her buttocks. Pt presents with LE and abdominal muscle weakness, pain with lumbar AROM with limitations and negative hip and neurodynamic special testing. Pt appropriate for skilled PT intervention to address impairments and improve function.   Education Assessment   Barriers to Learning Language;Cognitive   ORTHO GOALS   PT Ortho Eval Goals 1;2;3   Ortho Goal 1   Goal Identifier HEP   Goal Description Pt will be independent in HEP to address impairments and improve pain/function   Target Date 10/18/22   Ortho Goal 2   Goal Identifier Bending/lifting   Goal Description Pt will be able to bend and lift 10# with <4/10 pain and correct body mechanics for household chores   Target Date 11/16/22   Ortho Goal 3   Goal Identifier Walking   Goal Description Pt will be able to walk for 15-30 min with <3/10 pain for community mobility and exercise   Target Date 11/16/22   Total Evaluation Time   PT Eval, Low Complexity Minutes (53965) 18   Therapy Certification   Certification date from 08/19/22   Certification date to 11/16/22   Medical Diagnosis  Chronic midline low back pain without sciatica     Barb Cantrell, PT, DPT, CLT-GARETT

## 2022-08-26 ENCOUNTER — HOSPITAL ENCOUNTER (OUTPATIENT)
Dept: PHYSICAL THERAPY | Facility: REHABILITATION | Age: 42
Discharge: HOME | End: 2022-08-26
Payer: COMMERCIAL

## 2022-08-26 DIAGNOSIS — M54.50 ACUTE BILATERAL LOW BACK PAIN WITHOUT SCIATICA: ICD-10-CM

## 2022-08-26 DIAGNOSIS — G89.29 CHRONIC MIDLINE LOW BACK PAIN WITHOUT SCIATICA: Primary | ICD-10-CM

## 2022-08-26 DIAGNOSIS — M62.81 MUSCLE WEAKNESS (GENERALIZED): ICD-10-CM

## 2022-08-26 DIAGNOSIS — M54.50 CHRONIC MIDLINE LOW BACK PAIN WITHOUT SCIATICA: Primary | ICD-10-CM

## 2022-08-26 PROCEDURE — 97110 THERAPEUTIC EXERCISES: CPT | Mod: GP | Performed by: PHYSICAL THERAPIST

## 2022-09-02 ENCOUNTER — NURSE TRIAGE (OUTPATIENT)
Dept: NURSING | Facility: CLINIC | Age: 42
End: 2022-09-02

## 2022-09-02 DIAGNOSIS — B85.2 LICE: Primary | ICD-10-CM

## 2022-09-02 NOTE — TELEPHONE ENCOUNTER
Pt's brother, Fanny calling (with Pt present who gives verbal C2C) with concerns about;    (Interpretor, Domonique ([phone]) is engaged with this call)    Pt is having issures with her head lice X 2 months now.  Did only 1 OTC treatment of NIX about 1 month ago and did not help.  States they can see lice moving.    Denies:  Open sores, pus, scabs    According to the protocol, patient should call physician within 24 hours to advise.  Care advice given. Patient verbalizes understanding and agrees with plan of care. Message routed to requested Physician, Dr. Munoz at [facility].    Jordana Robins RN, Nurse Advisor 1:58 PM 9/2/2022  COVID 19 Nurse Triage Plan/Patient Instructions    Please be aware that novel coronavirus (COVID-19) may be circulating in the community. If you develop symptoms such as fever, cough, or SOB or if you have concerns about the presence of another infection including coronavirus (COVID-19), please contact your health care provider or visit [email or web address].     Disposition/Instructions    Home care recommended. Follow home care protocol based instructions.    Thank you for taking steps to prevent the spread of this virus.  o Limit your contact with others.  o Wear a simple mask to cover your cough.  o Wash your hands well and often.    Reason for Disposition    Head lice or nits recur within 1 month of completing treatment with permethrin (e.g., NIX Creme Rinse, Kwellada-P Creme Rinse)    Additional Information    Negative: Pubic lice    Negative: Doesn't match the SYMPTOMS for head lice and insect bite suspected    Negative: Doesn't match the SYMPTOMS for head lice    Negative: Looks infected (e.g., pus, soft scabs, open sores)    Negative: [1] More than 24 hours since completing treatment with permethrin (e.g., NIX Creme Rinse, Kwellada-P Creme Rinse) AND [2] moving lice are seen in the hair    Negative: [1] New-onset head lice AND [2] much resistance to over-the-counter meds in  community    Protocols used: HEAD LICE-A-AH

## 2022-09-02 NOTE — TELEPHONE ENCOUNTER
Call back:    Permethrin prescribed    Follow the following instructions:    Prior to application, wash hair with conditioner-free shampoo; rinse with water and towel dry. Apply a sufficient amount of liquid to saturate the hair and scalp (especially behind the ears and nape of neck). Leave on hair for 10 minutes (but no longer), then rinse off with warm water; remove remaining nits with nit comb. A single application is generally sufficient; however, may repeat 7 days after first treatment if lice or nits are still present.

## 2022-09-27 ENCOUNTER — HOSPITAL ENCOUNTER (OUTPATIENT)
Dept: PHYSICAL THERAPY | Facility: REHABILITATION | Age: 42
Discharge: HOME | End: 2022-09-27
Payer: COMMERCIAL

## 2022-09-27 DIAGNOSIS — G89.29 CHRONIC MIDLINE LOW BACK PAIN WITHOUT SCIATICA: Primary | ICD-10-CM

## 2022-09-27 DIAGNOSIS — M54.50 ACUTE BILATERAL LOW BACK PAIN WITHOUT SCIATICA: ICD-10-CM

## 2022-09-27 DIAGNOSIS — M54.50 CHRONIC MIDLINE LOW BACK PAIN WITHOUT SCIATICA: Primary | ICD-10-CM

## 2022-09-27 DIAGNOSIS — M62.81 MUSCLE WEAKNESS (GENERALIZED): ICD-10-CM

## 2022-09-27 PROCEDURE — 97110 THERAPEUTIC EXERCISES: CPT | Mod: GP | Performed by: PHYSICAL THERAPIST

## 2022-10-05 ENCOUNTER — TELEPHONE (OUTPATIENT)
Dept: PHYSICAL THERAPY | Facility: REHABILITATION | Age: 42
End: 2022-10-05

## 2022-10-05 NOTE — TELEPHONE ENCOUNTER
Patient did not show for appointment today. PT called via interpreting service and left message with sister regarding missed visit.  PT gave time and date of next appointment for sister to remind Louise.  PT also advised that if patient does not show to the next appointment she will be discharged from therapy due to not showing for 2 visits. PT also noted that next appointment is last PT scheduled.  If patient is interested in continuing PT she should call to schedule additional visits.

## 2022-10-31 NOTE — PROGRESS NOTES
AUDIOLOGY REPORT    SUBJECTIVE: Louise Bagley is a 42 year old female who was seen in the Audiology Clinic at the [facility] for audiologic evaluation, referred by, Bettye Munoz DO. They were accompanied today by their brother and . Per chart review patient's history is significant for schizophrenia and intellectual delay. Brother helps with history today. Today patient reports that both ears feel very plugged constantly, and she needs them cleaned out, as she has not had this done in a long time. Feels the hearing in both ears is getting worse. Has bilateral non-pulsatile tinnitus. She also states she is experiencing vertigo daily. Previously this only occurred  Rarely, but is becoming more frequent and is getting worse. She denied otalgia, otorrhea, history of ear surgery and loud noise exposure.     OBJECTIVE/ASSESSMENT:  Abuse Screening:  Do you feel unsafe at home or work/school? No  Do you feel threatened by someone? No  Does anyone try to keep you from having contact with others, or doing things outside of your home? No  Physical signs of abuse present? No     Fall Risk Screen:  1. Have you fallen two or more times in the past year? No  2. Have you fallen and had an injury in the past year? No    Timed Up and Go Score (in seconds): not tested  Is patient a fall risk? No  Referral initiated: No  Fall Risk Assessment Completed by Audiology    Otoscopic exam indicates cerumen bilaterally. Non-occluding cerumen in the right ear, almost occluding cerumen in the left ear.     With patient consent removed a large amount of debris without incident via curette from the left ear. Unable to successfully remove all of the cerumen. Brother states that he would like Louise to have the remaining cerumen removed by ENT before proceeding with the hearing test.  No testing performed today. Helped patient set up cerumen removal appointment with ENT followed by an ENT audio.     PLAN: It  is recommended that Louise follow up with ENT for cerumen removal followed by ENT audio. Patient has been scheduled for these appointments on 11/16/2022. Please call this clinic with questions regarding these results or recommendations.    Bernardo Maddox, CCC-A  [place] Licensed Audiologist #[id number]

## 2022-11-03 ENCOUNTER — OFFICE VISIT (OUTPATIENT)
Dept: AUDIOLOGY | Facility: CLINIC | Age: 42
End: 2022-11-03
Attending: STUDENT IN AN ORGANIZED HEALTH CARE EDUCATION/TRAINING PROGRAM
Payer: COMMERCIAL

## 2022-11-03 DIAGNOSIS — H91.93 DECREASED HEARING, BILATERAL: ICD-10-CM

## 2022-11-03 PROCEDURE — V5010 ASSESSMENT FOR HEARING AID: HCPCS | Performed by: AUDIOLOGIST

## 2022-11-16 ENCOUNTER — OFFICE VISIT (OUTPATIENT)
Dept: AUDIOLOGY | Facility: CLINIC | Age: 42
End: 2022-11-16
Payer: COMMERCIAL

## 2022-11-16 ENCOUNTER — OFFICE VISIT (OUTPATIENT)
Dept: OTOLARYNGOLOGY | Facility: CLINIC | Age: 42
End: 2022-11-16
Payer: COMMERCIAL

## 2022-11-16 DIAGNOSIS — H90.3 SENSORINEURAL HEARING LOSS (SNHL) OF BOTH EARS: ICD-10-CM

## 2022-11-16 DIAGNOSIS — H61.23 EXCESSIVE CERUMEN IN BOTH EAR CANALS: Primary | ICD-10-CM

## 2022-11-16 DIAGNOSIS — H90.3 SENSORINEURAL HEARING LOSS, BILATERAL: Primary | ICD-10-CM

## 2022-11-16 PROCEDURE — 92504 EAR MICROSCOPY EXAMINATION: CPT | Performed by: OTOLARYNGOLOGY

## 2022-11-16 PROCEDURE — 92557 COMPREHENSIVE HEARING TEST: CPT | Performed by: AUDIOLOGIST

## 2022-11-16 PROCEDURE — 99203 OFFICE O/P NEW LOW 30 MIN: CPT | Mod: 25 | Performed by: OTOLARYNGOLOGY

## 2022-11-16 PROCEDURE — 92550 TYMPANOMETRY & REFLEX THRESH: CPT | Performed by: AUDIOLOGIST

## 2022-11-16 NOTE — PROGRESS NOTES
HPI: This patient presents to clinic today for cerumen removal before audiogram today. Has noticed some fullness of the ears and tinnitus bilaterally, but denies otalgia, otorrhea, vertigo, change in true hearing or tinnitus. Denies other symptoms.    Past medical history, surgical history, family history, social history, medications, and allergies have been reviewed and are documented above.     Review of Systems: a 10-system review was performed. Symptoms are noted in the HPI and on a scanned document in the computer chart.    Physical Examination:   GEN: no acute distress, alert and oriented  EYES: extraocular movements intact, pupils equal and round. Sclera clear.   EARS: bilateral excessive cerumen cleared under binocular microscopy using suction/loop/forceps prior to audiogram today. The tympanic membranes are noted to be intact and clear with no signs of infections, effusions, perforations, or retractions.  PULM: breathing comfortably on room air with no stertor or stridor. Chest expansion symmetric.  CARDS: no cyanosis or clubbing, normal carotid pulses    AUDIOGRAM: moderate to severe SNHL bilaterally with type A tymps. Actually has symmetric WRS in the two ears per the AuD description, not the % in the chart box on the test.    MEDICAL DECISION-MAKING: excess cerumen bilaterally cleared under binocular microscopy without difficulty. Moderate to severe SNHL. Medically clear for hearing aids should the patient desire them.

## 2022-11-16 NOTE — LETTER
11/16/2022         RE: Louise Bagley  [address]  [facility] [address]        Dear Colleague,    Thank you for referring your patient, Louise Bagley, to the [facility]. Please see a copy of my visit note below.    HPI: This patient presents to clinic today for cerumen removal before audiogram today. Has noticed some fullness of the ears and tinnitus bilaterally, but denies otalgia, otorrhea, vertigo, change in true hearing or tinnitus. Denies other symptoms.    Past medical history, surgical history, family history, social history, medications, and allergies have been reviewed and are documented above.     Review of Systems: a 10-system review was performed. Symptoms are noted in the HPI and on a scanned document in the computer chart.    Physical Examination:   GEN: no acute distress, alert and oriented  EYES: extraocular movements intact, pupils equal and round. Sclera clear.   EARS: bilateral excessive cerumen cleared under binocular microscopy using suction/loop/forceps prior to audiogram today. The tympanic membranes are noted to be intact and clear with no signs of infections, effusions, perforations, or retractions.  PULM: breathing comfortably on room air with no stertor or stridor. Chest expansion symmetric.  CARDS: no cyanosis or clubbing, normal carotid pulses    AUDIOGRAM: moderate to severe SNHL bilaterally with type A tymps. Actually has symmetric WRS in the two ears per the AuD description, not the % in the chart box on the test.    MEDICAL DECISION-MAKING: excess cerumen bilaterally cleared under binocular microscopy without difficulty. Moderate to severe SNHL. Medically clear for hearing aids should the patient desire them.         Again, thank you for allowing me to participate in the care of your patient.        Sincerely,        Raya Cat MD

## 2023-01-02 NOTE — PROGRESS NOTES
[facility]    Outpatient Physical Therapy Discharge Note  Patient: Louise Bagley  : 1980    Beginning/End Dates of Reporting Period:  2022 to 2022 (3 visits)    Referring Provider: Bettye Munoz DO    Therapy Diagnosis: low back pain, sciatica     Client Self Report: Pt reports the exercises can increase her back pain. Currently she reports 7/10 back pain.    Objective Measurements:  Objective Measure: Lumbar AROM  Details: flexion: to ankles, ext: mild, Rotation:R WNL,  L mild            Goals:  Goal Identifier HEP   Goal Description Pt will be independent in HEP to address impairments and improve pain/function   Target Date 10/18/22   Date Met      Progress (detail required for progress note): doing daily, continue to progress able     Goal Identifier Bending/lifting   Goal Description Pt will be able to bend and lift 10# with <4/10 pain and correct body mechanics for household chores   Target Date 22   Date Met      Progress (detail required for progress note):       Goal Identifier Walking   Goal Description Pt will be able to walk for 15-30 min with <3/10 pain for community mobility and exercise   Target Date 22   Date Met      Progress (detail required for progress note):       Goal Identifier     Goal Description     Target Date     Date Met      Progress (detail required for progress note):       Goal Identifier     Goal Description     Target Date     Date Met      Progress (detail required for progress note):       Goal Identifier     Goal Description     Target Date     Date Met      Progress (detail required for progress note):       Goal Identifier     Goal Description     Target Date     Date Met      Progress (detail required for progress note):       Goal Identifier     Goal Description     Target Date     Date Met      Progress (detail required for progress  note):             Plan:  Discharge from therapy.    Discharge:    Reason for Discharge: Patient chooses to discontinue therapy.      Discharge Plan: Patient to continue home program.

## 2023-01-02 NOTE — ADDENDUM NOTE
Encounter addended by: Radha Chase, PT on: 1/2/2023 3:47 PM   Actions taken: Episode resolved, Pend clinical note, Flowsheet accepted, Clinical Note Signed

## 2023-05-09 DIAGNOSIS — H91.93 DECREASED HEARING, BILATERAL: ICD-10-CM

## 2023-05-10 RX ORDER — CHOLECALCIFEROL (VITAMIN D3) 50 MCG
1 TABLET ORAL DAILY
Qty: 90 TABLET | Refills: 3 | OUTPATIENT
Start: 2023-05-10